# Patient Record
Sex: FEMALE | Race: WHITE | NOT HISPANIC OR LATINO | URBAN - METROPOLITAN AREA
[De-identification: names, ages, dates, MRNs, and addresses within clinical notes are randomized per-mention and may not be internally consistent; named-entity substitution may affect disease eponyms.]

---

## 2021-05-18 ENCOUNTER — OFFICE (OUTPATIENT)
Dept: URBAN - METROPOLITAN AREA CLINIC 67 | Facility: CLINIC | Age: 71
End: 2021-05-18
Payer: OTHER GOVERNMENT

## 2021-05-18 VITALS
TEMPERATURE: 96.8 F | HEIGHT: 61 IN | DIASTOLIC BLOOD PRESSURE: 70 MMHG | SYSTOLIC BLOOD PRESSURE: 139 MMHG | OXYGEN SATURATION: 94 % | HEART RATE: 96 BPM | WEIGHT: 101 LBS

## 2021-05-18 DIAGNOSIS — K58.9 IRRITABLE BOWEL SYNDROME WITHOUT DIARRHEA: ICD-10-CM

## 2021-05-18 DIAGNOSIS — K52.9 NONINFECTIVE GASTROENTERITIS AND COLITIS, UNSPECIFIED: ICD-10-CM

## 2021-05-18 PROCEDURE — 99214 OFFICE O/P EST MOD 30 MIN: CPT

## 2021-05-18 RX ORDER — COLESEVELAM HYDROCHLORIDE 625 MG/1
625 TABLET, FILM COATED ORAL
Qty: 30 | Refills: 3 | Status: COMPLETED
Start: 2021-05-18 | End: 2022-12-13

## 2021-05-18 RX ORDER — AMITRIPTYLINE HYDROCHLORIDE 10 MG/1
TABLET, FILM COATED ORAL
Qty: 180 | Refills: 1 | Status: COMPLETED
End: 2022-12-13

## 2021-05-18 NOTE — SERVICEHPINOTES
Lorna  Nabila   is seen today for a follow-up visit.     She returns today for IBS-D symptoms and requesting a refill of Amitriptyline, given by Dr. Edmondson, which she states helps with abdominal cramping/pain. She states she did not receive Welchol that was prescribed for her last year by Dr. Phillips but would like to try it.  She states she has 3-5 bouts of diarrhea daily but denies GI tract bleeding, abdominal discomfort or unintentional weight loss.

## 2022-12-13 ENCOUNTER — OFFICE (OUTPATIENT)
Dept: URBAN - METROPOLITAN AREA CLINIC 67 | Facility: CLINIC | Age: 72
End: 2022-12-13
Payer: OTHER GOVERNMENT

## 2022-12-13 VITALS
RESPIRATION RATE: 14 BRPM | WEIGHT: 91 LBS | HEART RATE: 69 BPM | HEIGHT: 61 IN | SYSTOLIC BLOOD PRESSURE: 132 MMHG | DIASTOLIC BLOOD PRESSURE: 72 MMHG

## 2022-12-13 DIAGNOSIS — R14.0 ABDOMINAL DISTENSION (GASEOUS): ICD-10-CM

## 2022-12-13 DIAGNOSIS — K58.0 IRRITABLE BOWEL SYNDROME WITH DIARRHEA: ICD-10-CM

## 2022-12-13 DIAGNOSIS — R63.4 ABNORMAL WEIGHT LOSS: ICD-10-CM

## 2022-12-13 PROCEDURE — 99214 OFFICE O/P EST MOD 30 MIN: CPT | Performed by: INTERNAL MEDICINE

## 2022-12-13 NOTE — SERVICENOTES
I will check a fecal elastase to evaluate for possible EPI and will get a copy of the blood work she done through Dr. Rick's office. In the meantime, I will have her continue to use OTC Imodium as needed.

## 2022-12-13 NOTE — SERVICEHPINOTES
Lorna Dahl   is seen today for a follow-up visit   after a somewhat lengthy absence, regarding suspected IBS-D.Per her report, a colonoscopy done in 2019 (Dr. Edmondson) was normal, including biopsies to evaluate for microscopic colitis. She was placed on a trial of Elavil at that time but it did not help with her symptoms. In 2/2020, we stopped the Elavil and placed her on a trial of Welchol. In addition, her tTG IgA Ab titer was normal/negative.
br Today, she reports that she feels she gets the best results with taking OTC imodium (1 tablet every other day) and no longer takes the Welchol. 
br She reports that she still experiences some abdominal bloating and cramping but no GI tract bleeding symptoms, emesis or dysphagia/odynophagia - however she endorses a 15-lb weight loss in the past 3 months. Per her report, blood work done through Dr. Rick's office recent was all normal (those results are forthcoming). kailee